# Patient Record
Sex: FEMALE | Race: BLACK OR AFRICAN AMERICAN | Employment: UNEMPLOYED | URBAN - METROPOLITAN AREA
[De-identification: names, ages, dates, MRNs, and addresses within clinical notes are randomized per-mention and may not be internally consistent; named-entity substitution may affect disease eponyms.]

---

## 2022-05-25 LAB
ANTIBODY SCREEN, EXTERNAL: NEGATIVE
CHLAMYDIA, EXTERNAL: NEGATIVE
HBSAG, EXTERNAL: NEGATIVE
HEPATITIS C AB,   EXT: NON REACTIVE
HIV, EXTERNAL: NON REACTIVE
N. GONORRHEA, EXTERNAL: NEGATIVE
RPR, EXTERNAL: NON REACTIVE
TYPE, ABO & RH, EXTERNAL: NORMAL

## 2022-08-17 LAB — GRBS, EXTERNAL: NEGATIVE

## 2022-09-27 ENCOUNTER — HOSPITAL ENCOUNTER (INPATIENT)
Age: 42
LOS: 3 days | Discharge: HOME | End: 2022-09-30
Attending: OBSTETRICS & GYNECOLOGY | Admitting: SPECIALIST
Payer: COMMERCIAL

## 2022-09-27 PROBLEM — Z34.90 PREGNANCY: Status: ACTIVE | Noted: 2022-09-27

## 2022-09-27 LAB
ABO + RH BLD: NORMAL
ALBUMIN SERPL-MCNC: 2.8 G/DL (ref 3.5–5)
ALBUMIN/GLOB SERPL: 0.7 {RATIO} (ref 1.1–2.2)
ALP SERPL-CCNC: 248 U/L (ref 45–117)
ALT SERPL-CCNC: 19 U/L (ref 12–78)
AMPHET UR QL SCN: NEGATIVE
ANION GAP SERPL CALC-SCNC: 9 MMOL/L (ref 5–15)
AST SERPL-CCNC: 18 U/L (ref 15–37)
BARBITURATES UR QL SCN: NEGATIVE
BASOPHILS # BLD: 0 K/UL (ref 0–0.1)
BASOPHILS NFR BLD: 0 % (ref 0–1)
BENZODIAZ UR QL: NEGATIVE
BILIRUB SERPL-MCNC: 0.3 MG/DL (ref 0.2–1)
BLOOD GROUP ANTIBODIES SERPL: NORMAL
BUN SERPL-MCNC: 12 MG/DL (ref 6–20)
BUN/CREAT SERPL: 17 (ref 12–20)
CALCIUM SERPL-MCNC: 9.2 MG/DL (ref 8.5–10.1)
CANNABINOIDS UR QL SCN: NEGATIVE
CHLORIDE SERPL-SCNC: 107 MMOL/L (ref 97–108)
CO2 SERPL-SCNC: 21 MMOL/L (ref 21–32)
COCAINE UR QL SCN: NEGATIVE
CREAT SERPL-MCNC: 0.7 MG/DL (ref 0.55–1.02)
DIFFERENTIAL METHOD BLD: ABNORMAL
DRUG SCRN COMMENT,DRGCM: NORMAL
EOSINOPHIL # BLD: 0 K/UL (ref 0–0.4)
EOSINOPHIL NFR BLD: 1 % (ref 0–7)
ERYTHROCYTE [DISTWIDTH] IN BLOOD BY AUTOMATED COUNT: 13.4 % (ref 11.5–14.5)
GLOBULIN SER CALC-MCNC: 4 G/DL (ref 2–4)
GLUCOSE SERPL-MCNC: 74 MG/DL (ref 65–100)
HCT VFR BLD AUTO: 37.9 % (ref 35–47)
HGB BLD-MCNC: 12.6 G/DL (ref 11.5–16)
IMM GRANULOCYTES # BLD AUTO: 0 K/UL (ref 0–0.04)
IMM GRANULOCYTES NFR BLD AUTO: 1 % (ref 0–0.5)
LYMPHOCYTES # BLD: 1.5 K/UL (ref 0.8–3.5)
LYMPHOCYTES NFR BLD: 22 % (ref 12–49)
MCH RBC QN AUTO: 28.2 PG (ref 26–34)
MCHC RBC AUTO-ENTMCNC: 33.2 G/DL (ref 30–36.5)
MCV RBC AUTO: 84.8 FL (ref 80–99)
METHADONE UR QL: NEGATIVE
MONOCYTES # BLD: 0.6 K/UL (ref 0–1)
MONOCYTES NFR BLD: 9 % (ref 5–13)
NEUTS SEG # BLD: 4.8 K/UL (ref 1.8–8)
NEUTS SEG NFR BLD: 67 % (ref 32–75)
NRBC # BLD: 0 K/UL (ref 0–0.01)
NRBC BLD-RTO: 0 PER 100 WBC
OPIATES UR QL: NEGATIVE
PCP UR QL: NEGATIVE
PLATELET # BLD AUTO: 236 K/UL (ref 150–400)
PMV BLD AUTO: 10.9 FL (ref 8.9–12.9)
POTASSIUM SERPL-SCNC: 4 MMOL/L (ref 3.5–5.1)
PROT SERPL-MCNC: 6.8 G/DL (ref 6.4–8.2)
RBC # BLD AUTO: 4.47 M/UL (ref 3.8–5.2)
SODIUM SERPL-SCNC: 137 MMOL/L (ref 136–145)
SPECIMEN EXP DATE BLD: NORMAL
WBC # BLD AUTO: 7 K/UL (ref 3.6–11)

## 2022-09-27 PROCEDURE — 36415 COLL VENOUS BLD VENIPUNCTURE: CPT

## 2022-09-27 PROCEDURE — 74011250637 HC RX REV CODE- 250/637: Performed by: SPECIALIST

## 2022-09-27 PROCEDURE — 86900 BLOOD TYPING SEROLOGIC ABO: CPT

## 2022-09-27 PROCEDURE — 80053 COMPREHEN METABOLIC PANEL: CPT

## 2022-09-27 PROCEDURE — 85025 COMPLETE CBC W/AUTO DIFF WBC: CPT

## 2022-09-27 PROCEDURE — 80307 DRUG TEST PRSMV CHEM ANLYZR: CPT

## 2022-09-27 PROCEDURE — 65410000002 HC RM PRIVATE OB

## 2022-09-27 PROCEDURE — 3E0DXGC INTRODUCTION OF OTHER THERAPEUTIC SUBSTANCE INTO MOUTH AND PHARYNX, EXTERNAL APPROACH: ICD-10-PCS | Performed by: SPECIALIST

## 2022-09-27 RX ORDER — ONDANSETRON 2 MG/ML
4 INJECTION INTRAMUSCULAR; INTRAVENOUS
Status: DISCONTINUED | OUTPATIENT
Start: 2022-09-27 | End: 2022-09-28

## 2022-09-27 RX ORDER — SODIUM CHLORIDE 0.9 % (FLUSH) 0.9 %
5-40 SYRINGE (ML) INJECTION AS NEEDED
Status: DISCONTINUED | OUTPATIENT
Start: 2022-09-27 | End: 2022-09-28

## 2022-09-27 RX ORDER — OXYTOCIN/RINGER'S LACTATE 30/500 ML
0-20 PLASTIC BAG, INJECTION (ML) INTRAVENOUS
Status: DISCONTINUED | OUTPATIENT
Start: 2022-09-28 | End: 2022-09-28

## 2022-09-27 RX ORDER — SODIUM CHLORIDE 0.9 % (FLUSH) 0.9 %
5-40 SYRINGE (ML) INJECTION EVERY 8 HOURS
Status: DISCONTINUED | OUTPATIENT
Start: 2022-09-27 | End: 2022-09-28

## 2022-09-27 RX ORDER — LABETALOL 100 MG/1
100 TABLET, FILM COATED ORAL 2 TIMES DAILY
COMMUNITY
End: 2022-09-30

## 2022-09-27 RX ORDER — OXYTOCIN/RINGER'S LACTATE 30/500 ML
10 PLASTIC BAG, INJECTION (ML) INTRAVENOUS AS NEEDED
Status: COMPLETED | OUTPATIENT
Start: 2022-09-27 | End: 2022-09-28

## 2022-09-27 RX ORDER — SODIUM CHLORIDE, SODIUM LACTATE, POTASSIUM CHLORIDE, CALCIUM CHLORIDE 600; 310; 30; 20 MG/100ML; MG/100ML; MG/100ML; MG/100ML
125 INJECTION, SOLUTION INTRAVENOUS CONTINUOUS
Status: DISCONTINUED | OUTPATIENT
Start: 2022-09-27 | End: 2022-09-28

## 2022-09-27 RX ORDER — LABETALOL 100 MG/1
100 TABLET, FILM COATED ORAL 2 TIMES DAILY
Status: DISCONTINUED | OUTPATIENT
Start: 2022-09-27 | End: 2022-09-30 | Stop reason: HOSPADM

## 2022-09-27 RX ORDER — BUTORPHANOL TARTRATE 2 MG/ML
2 INJECTION INTRAMUSCULAR; INTRAVENOUS
Status: DISCONTINUED | OUTPATIENT
Start: 2022-09-27 | End: 2022-09-28

## 2022-09-27 RX ORDER — NALOXONE HYDROCHLORIDE 0.4 MG/ML
0.4 INJECTION, SOLUTION INTRAMUSCULAR; INTRAVENOUS; SUBCUTANEOUS AS NEEDED
Status: DISCONTINUED | OUTPATIENT
Start: 2022-09-27 | End: 2022-09-28

## 2022-09-27 RX ORDER — OXYTOCIN/RINGER'S LACTATE 30/500 ML
87.3 PLASTIC BAG, INJECTION (ML) INTRAVENOUS AS NEEDED
Status: DISCONTINUED | OUTPATIENT
Start: 2022-09-27 | End: 2022-09-30 | Stop reason: HOSPADM

## 2022-09-27 RX ADMIN — Medication 25 MCG: at 23:58

## 2022-09-27 RX ADMIN — Medication 25 MCG: at 20:07

## 2022-09-27 RX ADMIN — LABETALOL HYDROCHLORIDE 100 MG: 100 TABLET, FILM COATED ORAL at 20:07

## 2022-09-27 NOTE — PROGRESS NOTES
1910- Bedside and Verbal shift change report given to SANJUANA Cabrera RN (oncoming nurse) by NESTOR Hinkle RN (offgoing nurse). Report included the following information SBAR.     0600- Dr. Hershel Curling assessing FHR and TOCO monitoring. Viewed that Cytotec held at 0400 due to [facility] monitoring and assessment. Ok to start pitocin per Dr. Hershel Curling. 0710- Bedside and Verbal shift change report given to NESTOR Hinkle RN and Atilano Baumgarten RN  (oncoming nurse) by SANJUANA Cabrera RN (offgoing nurse). Report included the following information SBAR.

## 2022-09-27 NOTE — PROGRESS NOTES
1630 - Pt arrived to L&D for scheduled induction. Pt reports positive FM, no LOF or bleeding, pt denies headache, epigastric pain and visual disturbances. Pt states she has not had any problems during this pregnancy, only chronic HTN and takes labetalol 100mg BID. Pt on monitor. 09/27/22 1715   Cervical Exam   Dilation (cm) 1   Eff 50 %   Station -3   Vaginal exam done by?   Dr. Clara Cervantes

## 2022-09-27 NOTE — H&P
History & Physical    Name: Felisha Warren MRN: [id number]  SSN: [id number]    YOB: 1980  Age: 39 y.o. Sex: female      Subjective:     Estimated Date of Delivery: None noted. OB History    Para Term  AB Living   1             SAB IAB Ectopic Molar Multiple Live Births                    # Outcome Date GA Lbr Jason/2nd Weight Sex Delivery Anes PTL Lv   1 Current                MsCollins Bangura is admitted with pregnancy at Unknown for induction of labor due to hypertension. Prenatal course was complicated by chronic hypertension. Please see prenatal records for details. No past medical history on file. No past surgical history on file. Social History     Occupational History    Not on file   Tobacco Use    Smoking status: Not on file    Smokeless tobacco: Not on file   Substance and Sexual Activity    Alcohol use: Not on file    Drug use: Not on file    Sexual activity: Not on file     No family history on file. No Known Allergies  Prior to Admission medications    Not on File        Review of Systems: A comprehensive review of systems was negative except for that written in the History of Present Illness. Objective:     Vitals:  Vitals:    22 1651   Weight: 80.7 kg (178 lb)   Height: 5' 5\" (1.651 m)        Physical Exam:  Ab soft, gravid, nt  Ext nt, trace edema  Membranes:  Intact  Fetal Heart Rate: Reactive   EFW 3500gm  VE: 1/50/-1, vtx, pelvis adequate         Prenatal Labs:   No results found for: ABORH, RUBELLAEXT, HBSAGEXT, HIVEXT, RPREXT, GONNOEXT, CHLAMEXT, ABORHEXT, RUBELLAEXT, GRBSEXT, HBSAGEXT, HIVEXT, RPREXT, GONNOEXT, CHLAMEXT    Impression/Plan:     Active Problems:    Pregnancy (2022)         Plan: Admit for induction of labor.  Group B Strep negative  Cytotec tonight  Pitocin in am.

## 2022-09-28 ENCOUNTER — ANESTHESIA EVENT (OUTPATIENT)
Dept: LABOR AND DELIVERY | Age: 42
End: 2022-09-28
Payer: COMMERCIAL

## 2022-09-28 ENCOUNTER — ANESTHESIA (OUTPATIENT)
Dept: LABOR AND DELIVERY | Age: 42
End: 2022-09-28
Payer: COMMERCIAL

## 2022-09-28 LAB
ERYTHROCYTE [DISTWIDTH] IN BLOOD BY AUTOMATED COUNT: 13.6 % (ref 11.5–14.5)
HCT VFR BLD AUTO: 37.8 % (ref 35–47)
HGB BLD-MCNC: 12.3 G/DL (ref 11.5–16)
MCH RBC QN AUTO: 28 PG (ref 26–34)
MCHC RBC AUTO-ENTMCNC: 32.5 G/DL (ref 30–36.5)
MCV RBC AUTO: 85.9 FL (ref 80–99)
NRBC # BLD: 0 K/UL (ref 0–0.01)
NRBC BLD-RTO: 0 PER 100 WBC
PLATELET # BLD AUTO: 172 K/UL (ref 150–400)
PMV BLD AUTO: 10.2 FL (ref 8.9–12.9)
RBC # BLD AUTO: 4.4 M/UL (ref 3.8–5.2)
WBC # BLD AUTO: 10.1 K/UL (ref 3.6–11)

## 2022-09-28 PROCEDURE — 0KQM0ZZ REPAIR PERINEUM MUSCLE, OPEN APPROACH: ICD-10-PCS | Performed by: OBSTETRICS & GYNECOLOGY

## 2022-09-28 PROCEDURE — 77030008684 HC TU ET CUF COVD -B: Performed by: NURSE ANESTHETIST, CERTIFIED REGISTERED

## 2022-09-28 PROCEDURE — 74011250636 HC RX REV CODE- 250/636: Performed by: SPECIALIST

## 2022-09-28 PROCEDURE — 77030014125 HC TY EPDRL BBMI -B: Performed by: ANESTHESIOLOGY

## 2022-09-28 PROCEDURE — 36415 COLL VENOUS BLD VENIPUNCTURE: CPT

## 2022-09-28 PROCEDURE — 75410000003 HC RECOV DEL/VAG/CSECN EA 0.5 HR

## 2022-09-28 PROCEDURE — 10907ZC DRAINAGE OF AMNIOTIC FLUID, THERAPEUTIC FROM PRODUCTS OF CONCEPTION, VIA NATURAL OR ARTIFICIAL OPENING: ICD-10-PCS | Performed by: OBSTETRICS & GYNECOLOGY

## 2022-09-28 PROCEDURE — 3E033VJ INTRODUCTION OF OTHER HORMONE INTO PERIPHERAL VEIN, PERCUTANEOUS APPROACH: ICD-10-PCS | Performed by: SPECIALIST

## 2022-09-28 PROCEDURE — 74011000250 HC RX REV CODE- 250

## 2022-09-28 PROCEDURE — 00HU33Z INSERTION OF INFUSION DEVICE INTO SPINAL CANAL, PERCUTANEOUS APPROACH: ICD-10-PCS | Performed by: ANESTHESIOLOGY

## 2022-09-28 PROCEDURE — 74011000250 HC RX REV CODE- 250: Performed by: ANESTHESIOLOGY

## 2022-09-28 PROCEDURE — 75410000000 HC DELIVERY VAGINAL/SINGLE

## 2022-09-28 PROCEDURE — 77030026438 HC STYL ET INTUB CARD -A: Performed by: NURSE ANESTHETIST, CERTIFIED REGISTERED

## 2022-09-28 PROCEDURE — 88307 TISSUE EXAM BY PATHOLOGIST: CPT

## 2022-09-28 PROCEDURE — 4A1HXCZ MONITORING OF PRODUCTS OF CONCEPTION, CARDIAC RATE, EXTERNAL APPROACH: ICD-10-PCS | Performed by: OBSTETRICS & GYNECOLOGY

## 2022-09-28 PROCEDURE — 76210000006 HC OR PH I REC 0.5 TO 1 HR

## 2022-09-28 PROCEDURE — 74011250637 HC RX REV CODE- 250/637: Performed by: OBSTETRICS & GYNECOLOGY

## 2022-09-28 PROCEDURE — 65410000002 HC RM PRIVATE OB

## 2022-09-28 PROCEDURE — 76060000078 HC EPIDURAL ANESTHESIA

## 2022-09-28 PROCEDURE — 10D17Z9 MANUAL EXTRACTION OF PRODUCTS OF CONCEPTION, RETAINED, VIA NATURAL OR ARTIFICIAL OPENING: ICD-10-PCS | Performed by: OBSTETRICS & GYNECOLOGY

## 2022-09-28 PROCEDURE — 74011000250 HC RX REV CODE- 250: Performed by: NURSE ANESTHETIST, CERTIFIED REGISTERED

## 2022-09-28 PROCEDURE — 0UQMXZZ REPAIR VULVA, EXTERNAL APPROACH: ICD-10-PCS | Performed by: OBSTETRICS & GYNECOLOGY

## 2022-09-28 PROCEDURE — 75410000002 HC LABOR FEE PER 1 HR

## 2022-09-28 PROCEDURE — 74011250637 HC RX REV CODE- 250/637: Performed by: SPECIALIST

## 2022-09-28 PROCEDURE — 74011250636 HC RX REV CODE- 250/636: Performed by: NURSE ANESTHETIST, CERTIFIED REGISTERED

## 2022-09-28 PROCEDURE — 85027 COMPLETE CBC AUTOMATED: CPT

## 2022-09-28 RX ORDER — SODIUM CHLORIDE 0.9 % (FLUSH) 0.9 %
5-40 SYRINGE (ML) INJECTION EVERY 8 HOURS
Status: DISCONTINUED | OUTPATIENT
Start: 2022-09-28 | End: 2022-09-28

## 2022-09-28 RX ORDER — SODIUM CHLORIDE 9 MG/ML
INJECTION, SOLUTION INTRAVENOUS
Status: DISCONTINUED | OUTPATIENT
Start: 2022-09-28 | End: 2022-09-28 | Stop reason: HOSPADM

## 2022-09-28 RX ORDER — LANOLIN ALCOHOL/MO/W.PET/CERES
1 CREAM (GRAM) TOPICAL
Status: DISCONTINUED | OUTPATIENT
Start: 2022-09-29 | End: 2022-09-30 | Stop reason: HOSPADM

## 2022-09-28 RX ORDER — ONDANSETRON 4 MG/1
4 TABLET, ORALLY DISINTEGRATING ORAL
Status: ACTIVE | OUTPATIENT
Start: 2022-09-28 | End: 2022-09-29

## 2022-09-28 RX ORDER — FENTANYL/BUPIVACAINE/NS/PF 2-1250MCG
12 PREFILLED PUMP RESERVOIR EPIDURAL CONTINUOUS
Status: DISCONTINUED | OUTPATIENT
Start: 2022-09-28 | End: 2022-09-28

## 2022-09-28 RX ORDER — ACETAMINOPHEN 325 MG/1
650 TABLET ORAL
Status: DISCONTINUED | OUTPATIENT
Start: 2022-09-28 | End: 2022-09-30 | Stop reason: HOSPADM

## 2022-09-28 RX ORDER — OXYCODONE AND ACETAMINOPHEN 5; 325 MG/1; MG/1
1 TABLET ORAL
Status: DISCONTINUED | OUTPATIENT
Start: 2022-09-28 | End: 2022-09-30 | Stop reason: HOSPADM

## 2022-09-28 RX ORDER — SUCCINYLCHOLINE CHLORIDE 20 MG/ML
INJECTION INTRAMUSCULAR; INTRAVENOUS AS NEEDED
Status: DISCONTINUED | OUTPATIENT
Start: 2022-09-28 | End: 2022-09-28 | Stop reason: HOSPADM

## 2022-09-28 RX ORDER — DEXAMETHASONE SODIUM PHOSPHATE 4 MG/ML
INJECTION, SOLUTION INTRA-ARTICULAR; INTRALESIONAL; INTRAMUSCULAR; INTRAVENOUS; SOFT TISSUE AS NEEDED
Status: DISCONTINUED | OUTPATIENT
Start: 2022-09-28 | End: 2022-09-28 | Stop reason: HOSPADM

## 2022-09-28 RX ORDER — BUPIVACAINE HYDROCHLORIDE AND EPINEPHRINE 2.5; 5 MG/ML; UG/ML
INJECTION, SOLUTION EPIDURAL; INFILTRATION; INTRACAUDAL; PERINEURAL
Status: COMPLETED
Start: 2022-09-28 | End: 2022-09-28

## 2022-09-28 RX ORDER — LIDOCAINE HYDROCHLORIDE 20 MG/ML
INJECTION, SOLUTION EPIDURAL; INFILTRATION; INTRACAUDAL; PERINEURAL AS NEEDED
Status: DISCONTINUED | OUTPATIENT
Start: 2022-09-28 | End: 2022-09-28 | Stop reason: HOSPADM

## 2022-09-28 RX ORDER — HYDROMORPHONE HYDROCHLORIDE 1 MG/ML
.2-.5 INJECTION, SOLUTION INTRAMUSCULAR; INTRAVENOUS; SUBCUTANEOUS
Status: DISCONTINUED | OUTPATIENT
Start: 2022-09-28 | End: 2022-09-28 | Stop reason: HOSPADM

## 2022-09-28 RX ORDER — BUPIVACAINE HYDROCHLORIDE AND EPINEPHRINE 2.5; 5 MG/ML; UG/ML
INJECTION, SOLUTION EPIDURAL; INFILTRATION; INTRACAUDAL; PERINEURAL
Status: COMPLETED | OUTPATIENT
Start: 2022-09-28 | End: 2022-09-28

## 2022-09-28 RX ORDER — OXYTOCIN/RINGER'S LACTATE 30/500 ML
87.3 PLASTIC BAG, INJECTION (ML) INTRAVENOUS AS NEEDED
Status: DISCONTINUED | OUTPATIENT
Start: 2022-09-28 | End: 2022-09-30 | Stop reason: HOSPADM

## 2022-09-28 RX ORDER — IBUPROFEN 400 MG/1
800 TABLET ORAL EVERY 8 HOURS
Status: DISCONTINUED | OUTPATIENT
Start: 2022-09-28 | End: 2022-09-30 | Stop reason: HOSPADM

## 2022-09-28 RX ORDER — FENTANYL CITRATE 50 UG/ML
INJECTION, SOLUTION INTRAMUSCULAR; INTRAVENOUS AS NEEDED
Status: DISCONTINUED | OUTPATIENT
Start: 2022-09-28 | End: 2022-09-28 | Stop reason: HOSPADM

## 2022-09-28 RX ORDER — ONDANSETRON 2 MG/ML
INJECTION INTRAMUSCULAR; INTRAVENOUS AS NEEDED
Status: DISCONTINUED | OUTPATIENT
Start: 2022-09-28 | End: 2022-09-28 | Stop reason: HOSPADM

## 2022-09-28 RX ORDER — SIMETHICONE 80 MG
80 TABLET,CHEWABLE ORAL
Status: DISCONTINUED | OUTPATIENT
Start: 2022-09-28 | End: 2022-09-30 | Stop reason: HOSPADM

## 2022-09-28 RX ORDER — PROPOFOL 10 MG/ML
INJECTION, EMULSION INTRAVENOUS AS NEEDED
Status: DISCONTINUED | OUTPATIENT
Start: 2022-09-28 | End: 2022-09-28 | Stop reason: HOSPADM

## 2022-09-28 RX ORDER — HYDROCORTISONE ACETATE PRAMOXINE HCL 2.5; 1 G/100G; G/100G
CREAM TOPICAL AS NEEDED
Status: DISCONTINUED | OUTPATIENT
Start: 2022-09-28 | End: 2022-09-30 | Stop reason: HOSPADM

## 2022-09-28 RX ORDER — FENTANYL CITRATE 50 UG/ML
25 INJECTION, SOLUTION INTRAMUSCULAR; INTRAVENOUS
Status: DISCONTINUED | OUTPATIENT
Start: 2022-09-28 | End: 2022-09-28 | Stop reason: HOSPADM

## 2022-09-28 RX ORDER — OXYTOCIN/RINGER'S LACTATE 30/500 ML
10 PLASTIC BAG, INJECTION (ML) INTRAVENOUS AS NEEDED
Status: DISCONTINUED | OUTPATIENT
Start: 2022-09-28 | End: 2022-09-30 | Stop reason: HOSPADM

## 2022-09-28 RX ORDER — FENTANYL/BUPIVACAINE/NS/PF 2-1250MCG
PREFILLED PUMP RESERVOIR EPIDURAL
Status: COMPLETED
Start: 2022-09-28 | End: 2022-09-28

## 2022-09-28 RX ORDER — FENTANYL CITRATE 50 UG/ML
50 INJECTION, SOLUTION INTRAMUSCULAR; INTRAVENOUS AS NEEDED
Status: CANCELLED | OUTPATIENT
Start: 2022-09-28

## 2022-09-28 RX ORDER — EPHEDRINE SULFATE/0.9% NACL/PF 50 MG/5 ML
12.5 SYRINGE (ML) INTRAVENOUS
Status: COMPLETED | OUTPATIENT
Start: 2022-09-28 | End: 2022-09-28

## 2022-09-28 RX ORDER — MORPHINE SULFATE 2 MG/ML
2 INJECTION, SOLUTION INTRAMUSCULAR; INTRAVENOUS
Status: DISCONTINUED | OUTPATIENT
Start: 2022-09-28 | End: 2022-09-30 | Stop reason: HOSPADM

## 2022-09-28 RX ORDER — SODIUM CHLORIDE 0.9 % (FLUSH) 0.9 %
5-40 SYRINGE (ML) INJECTION AS NEEDED
Status: DISCONTINUED | OUTPATIENT
Start: 2022-09-28 | End: 2022-09-28

## 2022-09-28 RX ORDER — LIDOCAINE HYDROCHLORIDE 10 MG/ML
0.1 INJECTION, SOLUTION EPIDURAL; INFILTRATION; INTRACAUDAL; PERINEURAL AS NEEDED
Status: CANCELLED | OUTPATIENT
Start: 2022-09-28

## 2022-09-28 RX ORDER — ONDANSETRON 2 MG/ML
4 INJECTION INTRAMUSCULAR; INTRAVENOUS AS NEEDED
Status: DISCONTINUED | OUTPATIENT
Start: 2022-09-28 | End: 2022-09-28 | Stop reason: HOSPADM

## 2022-09-28 RX ORDER — SODIUM CHLORIDE, SODIUM LACTATE, POTASSIUM CHLORIDE, CALCIUM CHLORIDE 600; 310; 30; 20 MG/100ML; MG/100ML; MG/100ML; MG/100ML
25 INJECTION, SOLUTION INTRAVENOUS CONTINUOUS
Status: DISCONTINUED | OUTPATIENT
Start: 2022-09-28 | End: 2022-09-28 | Stop reason: HOSPADM

## 2022-09-28 RX ORDER — DOCUSATE SODIUM 100 MG/1
100 CAPSULE, LIQUID FILLED ORAL
Status: DISCONTINUED | OUTPATIENT
Start: 2022-09-28 | End: 2022-09-30 | Stop reason: HOSPADM

## 2022-09-28 RX ORDER — FOLIC ACID/MULTIVIT,IRON,MINER 0.4MG-18MG
1 TABLET ORAL DAILY
Status: DISCONTINUED | OUTPATIENT
Start: 2022-09-29 | End: 2022-09-30 | Stop reason: HOSPADM

## 2022-09-28 RX ORDER — ZOLPIDEM TARTRATE 5 MG/1
5 TABLET ORAL
Status: DISCONTINUED | OUTPATIENT
Start: 2022-09-28 | End: 2022-09-30 | Stop reason: HOSPADM

## 2022-09-28 RX ORDER — SODIUM CHLORIDE, SODIUM LACTATE, POTASSIUM CHLORIDE, CALCIUM CHLORIDE 600; 310; 30; 20 MG/100ML; MG/100ML; MG/100ML; MG/100ML
25 INJECTION, SOLUTION INTRAVENOUS CONTINUOUS
Status: CANCELLED | OUTPATIENT
Start: 2022-09-28 | End: 2022-09-29

## 2022-09-28 RX ORDER — MIDAZOLAM HYDROCHLORIDE 1 MG/ML
1 INJECTION, SOLUTION INTRAMUSCULAR; INTRAVENOUS AS NEEDED
Status: CANCELLED | OUTPATIENT
Start: 2022-09-28

## 2022-09-28 RX ORDER — NALOXONE HYDROCHLORIDE 0.4 MG/ML
0.4 INJECTION, SOLUTION INTRAMUSCULAR; INTRAVENOUS; SUBCUTANEOUS AS NEEDED
Status: DISCONTINUED | OUTPATIENT
Start: 2022-09-28 | End: 2022-09-30 | Stop reason: HOSPADM

## 2022-09-28 RX ORDER — DIPHENHYDRAMINE HYDROCHLORIDE 50 MG/ML
12.5 INJECTION, SOLUTION INTRAMUSCULAR; INTRAVENOUS
Status: DISCONTINUED | OUTPATIENT
Start: 2022-09-28 | End: 2022-09-30 | Stop reason: HOSPADM

## 2022-09-28 RX ORDER — PHENYLEPHRINE HCL IN 0.9% NACL 0.4MG/10ML
SYRINGE (ML) INTRAVENOUS AS NEEDED
Status: DISCONTINUED | OUTPATIENT
Start: 2022-09-28 | End: 2022-09-28 | Stop reason: HOSPADM

## 2022-09-28 RX ADMIN — IBUPROFEN 800 MG: 400 TABLET, FILM COATED ORAL at 20:07

## 2022-09-28 RX ADMIN — Medication 12 ML/HR: at 08:50

## 2022-09-28 RX ADMIN — Medication 12.5 MG: at 09:08

## 2022-09-28 RX ADMIN — Medication 1 MILLI-UNITS/MIN: at 06:23

## 2022-09-28 RX ADMIN — LIDOCAINE HYDROCHLORIDE 40 MG: 20 INJECTION, SOLUTION INTRAVENOUS at 17:15

## 2022-09-28 RX ADMIN — FENTANYL CITRATE 25 MCG: 50 INJECTION, SOLUTION INTRAMUSCULAR; INTRAVENOUS at 17:28

## 2022-09-28 RX ADMIN — FENTANYL CITRATE 25 MCG: 50 INJECTION, SOLUTION INTRAMUSCULAR; INTRAVENOUS at 17:31

## 2022-09-28 RX ADMIN — FENTANYL CITRATE 25 MCG: 50 INJECTION, SOLUTION INTRAMUSCULAR; INTRAVENOUS at 17:33

## 2022-09-28 RX ADMIN — SODIUM CHLORIDE: 0.9 INJECTION, SOLUTION INTRAVENOUS at 17:11

## 2022-09-28 RX ADMIN — SUCCINYLCHOLINE CHLORIDE 160 MG: 20 INJECTION, SOLUTION INTRAMUSCULAR; INTRAVENOUS at 17:15

## 2022-09-28 RX ADMIN — SODIUM CHLORIDE, POTASSIUM CHLORIDE, SODIUM LACTATE AND CALCIUM CHLORIDE 125 ML/HR: 600; 310; 30; 20 INJECTION, SOLUTION INTRAVENOUS at 08:33

## 2022-09-28 RX ADMIN — OXYCODONE AND ACETAMINOPHEN 1 TABLET: 5; 325 TABLET ORAL at 14:58

## 2022-09-28 RX ADMIN — BUTORPHANOL TARTRATE 2 MG: 2 INJECTION, SOLUTION INTRAMUSCULAR; INTRAVENOUS at 04:31

## 2022-09-28 RX ADMIN — SODIUM CHLORIDE, POTASSIUM CHLORIDE, SODIUM LACTATE AND CALCIUM CHLORIDE 125 ML/HR: 600; 310; 30; 20 INJECTION, SOLUTION INTRAVENOUS at 06:23

## 2022-09-28 RX ADMIN — OXYCODONE AND ACETAMINOPHEN 1 TABLET: 5; 325 TABLET ORAL at 20:07

## 2022-09-28 RX ADMIN — BUPIVACAINE HYDROCHLORIDE AND EPINEPHRINE 20 ML: 2.5; 5 INJECTION, SOLUTION EPIDURAL; INFILTRATION; INTRACAUDAL; PERINEURAL at 08:32

## 2022-09-28 RX ADMIN — Medication 40 MCG: at 17:41

## 2022-09-28 RX ADMIN — DEXAMETHASONE SODIUM PHOSPHATE 4 MG: 4 INJECTION, SOLUTION INTRAMUSCULAR; INTRAVENOUS at 17:24

## 2022-09-28 RX ADMIN — LABETALOL HYDROCHLORIDE 100 MG: 100 TABLET, FILM COATED ORAL at 20:07

## 2022-09-28 RX ADMIN — FENTANYL CITRATE 50 MCG: 50 INJECTION, SOLUTION INTRAMUSCULAR; INTRAVENOUS at 17:46

## 2022-09-28 RX ADMIN — Medication 10000 MILLI-UNITS: at 12:13

## 2022-09-28 RX ADMIN — ONDANSETRON HYDROCHLORIDE 4 MG: 2 INJECTION, SOLUTION INTRAMUSCULAR; INTRAVENOUS at 17:33

## 2022-09-28 RX ADMIN — Medication 40 MCG: at 17:51

## 2022-09-28 RX ADMIN — PROPOFOL 200 MG: 10 INJECTION, EMULSION INTRAVENOUS at 17:15

## 2022-09-28 RX ADMIN — FENTANYL CITRATE 25 MCG: 50 INJECTION, SOLUTION INTRAMUSCULAR; INTRAVENOUS at 17:21

## 2022-09-28 NOTE — ANESTHESIA POSTPROCEDURE EVALUATION
Procedure(s):  DILATATION AND CURETTAGE. general    Anesthesia Post Evaluation      Multimodal analgesia: multimodal analgesia used between 6 hours prior to anesthesia start to PACU discharge  Patient location during evaluation: PACU  Patient participation: complete - patient participated  Level of consciousness: sleepy but conscious and responsive to verbal stimuli  Pain score: 2  Pain management: adequate  Airway patency: patent  Anesthetic complications: no  Cardiovascular status: acceptable  Respiratory status: acceptable  Hydration status: acceptable  Comments: +Post-Anesthesia Evaluation and Assessment    Patient: Efe Jansen MRN: [id number]  SSN: [id number]   YOB: 1980  Age: 39 y.o. Sex: female      Cardiovascular Function/Vital Signs    BP (!) 149/82   Pulse 75   Temp 37 °C (98.6 °F)   Resp 16   Ht 5' 5\" (1.651 m)   Wt 80.7 kg (178 lb)   SpO2 98%   Breastfeeding Unknown   BMI 29.62 kg/m²     Patient is status post Procedure(s):  DILATATION AND CURETTAGE. Nausea/Vomiting: Controlled. Postoperative hydration reviewed and adequate. Pain:  Pain Scale 1: Numeric (0 - 10) (09/28/22 1840)  Pain Intensity 1: 0 (09/28/22 1840)   Managed. Neurological Status:   Neuro (WDL): Exceptions to WDL (09/28/22 1811)   At baseline. Mental Status and Level of Consciousness: Arousable. Pulmonary Status:   O2 Device: None (Room air) (09/28/22 1840)   Adequate oxygenation and airway patent. Complications related to anesthesia: None    Post-anesthesia assessment completed. No concerns.     Signed By: Tomy Lieberman MD    9/28/2022  Post anesthesia nausea and vomiting:  controlled  Final Post Anesthesia Temperature Assessment:  Normothermia (36.0-37.5 degrees C)      INITIAL Post-op Vital signs:   Vitals Value Taken Time   /82 09/28/22 1840   Temp     Pulse 85 09/28/22 1844   Resp 11 09/28/22 1844   SpO2 99 % 09/28/22 1844   Vitals shown include unvalidated device data.

## 2022-09-28 NOTE — PROGRESS NOTES
Labor Progress Note  Patient seen, fetal heart rate and contraction pattern evaluated, patient examined. No data found. Physical Exam:  Cervical Exam:  4 cm dilated    50% effaced    -2 station    Presenting Part: cephalic  Cervical Position: posterior  Consistency: Medium  Membranes:  Artificial Rupture of Membranes; Amniotic Fluid: small amount of clear fluid  Uterine Activity: Frequency: Every 2-4 minutes  Fetal Heart Rate: Baseline: 150 per minute  Variability: moderate  Accelerations: yes  Decelerations: early occasional early deceleration    Assessment/Plan:  Reassuring fetal status, Labor  Progressing normally, Continue plan for vaginal delivery   Early/ variable decelerations mild; position changes.   May stop pitocin if continues or worsens  BPs controlled  Consents obtained  Epidural when desired

## 2022-09-28 NOTE — ANESTHESIA PROCEDURE NOTES
Epidural Block    Patient location during procedure: OB  Start time: 9/28/2022 8:32 AM  End time: 9/28/2022 8:43 AM  Reason for block: labor epidural  Staffing  Performed: attending   Anesthesiologist: Tamy Dodd MD  Preanesthetic Checklist  Completed: patient identified, IV checked, site marked, risks and benefits discussed, surgical consent, monitors and equipment checked, pre-op evaluation and timeout performed  Block Placement  Patient position: sitting  Prep: DuraPrep  Sterility prep: cap, drape, gloves and mask  Sedation level: no sedation  Patient monitoring: heart rate, frequent blood pressure checks and continuous pulse oximetry  Approach: midline  Location: lumbar  Lumbar location: L2-L3  Epidural  Loss of resistance technique: saline  Guidance: landmark technique  Needle  Needle type: Tuohy   Needle gauge: 17 G  Needle length: 10 cm  Needle insertion depth: 5 cm  Catheter type: multi-orifice  Catheter size: 19 G  Catheter at skin depth: 10 cm  Catheter securement method: clear occlusive dressing and surgical tape  Test dose: negative  Medications Administered  bupivacaine 0.25% -EPINEPHrine 1:200,000 (SENSORCAINE) mg Epidural - Epidural   20 mL - 9/28/2022 8:32:00 AM  Assessment  Sensory level: T10  Block outcome: pain improved  Number of attempts: 1  Procedure assessment: patient tolerated procedure well with no immediate complications

## 2022-09-28 NOTE — ADT AUTH CERT NOTES
[facility]     FACILITY NPI :  FACILITY TAX ID :      [address]  [facility] LABOR & DELIVERY  [address] [facility] [address] [phone]            Patient Name :Latonya Paul   : 1980 (41 yrs)  MRN : [id number]     Patient Mailing Address [address] [place] [address] [] , [phone]                                                               . Insurance Plan Payor: BLUE CROSS / Plan: [address] [place] [address] / Product Type: PPO /      Primary Coverage Subscriber ID :      Secondary Coverage:  N/A     Secondary Subscriber ID :        Current Patient Class : INPATIENT  Admit Date : 2022     REQUESTED LEVEL OF CARE: INPATIENT [101]                                                           Diagnosis : Pregnancy                          ICD10 Code : Pregnancy [I56.66]     Current Room and Bed 3166/01     Admitting and Attending Info:  Admitting Provider : Radha Baum MD   NPI: [id number]  Admitting Provider Phone.  [phone]  Admitting Provider Address: [address], [address]     Attending Provider Alyx Maradiaga MD   [id number]  Attending Provider Address:  [address]win                                                   [address] [place] [address]     Attending Provider Phone: Attending phys phone: [phone] INFORMATION :     Name :  Manuel Velasco    :   2022 (0 dy)      Delivery Type :        Weight in Grams :   3295 g      GA  :   39      Apgar 1 Min :   8  [8]      Apgar 5 Min :   9  [9]      Unit:   [facility] 3  NURSERY    Erik Maker     MRN: [id number]     Link to Baby  Comment        Baby's name MRN Account  Age Sex Admission Type    [address], [address] [id number] 22 0 days F Confirmed Admission - L&D      OB History       1    Para   1    Term   1            AB        Living   1      SAB        IAB        Ectopic        Molar        Multiple   0    Live Births   1         # Outcome Date GA Labor/2nd Weight Sex Delivery Anes PTL Lv A1 A5   1 Term 22 39w4d / 1h 35m 3.295 kg F Vag-Vacuum Epidural N Living 8 9   Name: Anamaria August   Complications: Dysfunctional Labor   Location: Other   Delivering Clinician: Fidencio Au MD        Prenatal History    Flowsheet Row Most Recent Value   Did You Receive Prenatal Care Yes   Name Of OB Provider Dr. Garth Mcgill [facility] (Maternal Fetal Medicine)? Yes   Fetal Ultrasound Abnormalities/Concerns? No   Infant Feeding Breast Milk   Circumcision Planned No   Pediatrician After Birth/ Follow Up Baby Visits Michael Neil     Dating Summary    Working DICK: 10/01/22 set by Ciera Goldstein on 22 based on Other Basis     Based On DICK [address] User Date   Other Basis 10/01/22 Working  Ciera Goldstein 22     Prenatal Results    Prenatal Labs    Test Value Date Time   ABO/Rh * O POS  22    Antibody Scrn * NEGATIVE  22    Hgb      Hct      Platelets      Rubella      RPR * NON REACTIVE  22    T.  Pallidum Antibody      Urine      Hep B Surf Ag * NEGATIVE  22    Hep C * NON REACTIVE  22    HIV * NON REACTIVE  22    Gonorrhea * NEGATIVE  22    Chlamydia * NEGATIVE  22    TSH      GTT, 1 HR (Glucola)      GTT, Fasting      GTT, 1 HR      GTT, 2 HR      GTT, 3 HR        3rd Trimester    Test Value Date Time   Hgb      Hct      Platelets      Group B Strep * NEGATIVE  22    Antibody Scrn      TSH      T. Pallidum Antibody      Hep B Surf Ag      Gonorrhea      Chlamydia       Screening/Diagnostics    Test Value Date Time   AFP Only      AFP Tetra      Hgb Electrophoresis      Amniocentesis      Cystic Fibrosis      [facility]      Canavan      PAP Smear      Beta HCG      NT      NIPT      COVID-19        Lab    Test Value Date Time   GTT, Fasting      GTT, 1HR      GTT, 2HR      GTT, 3HR      RPR * NON REACTIVE  22    Beta HCG      CMV Ab      Toxoplasma Ab      Varicella Zoster Ab         Legend    *: Historical  View all results for this pregnancy     March Rogelio [[id number]]  Graff Delivery    Birth date/time: 2022 12:10:00  Delivery type: Vaginal, Vacuum (Extractor)  Complications: Dysfunctional Labor  Labor Event Times    Dilation complete date/time: 2022 1035  Start pushing date/time: 2022 1038  Labor Events     labor?: No   steroids?: None  Antibiotics during labor?: No  Rupture date/time: 2022 0734  Rupture type: AROM  Fluid color: Clear  Fluid odor: None  Cervical ripening: Misoprostol  Induction: AROM, Prostaglandins  First cervical ripening date/time: 2022  Induction date/time: 2022  Induction indications: Hypertension, Elective  Augmentation: None  Complications: Dysfunctional Labor  Delivery Providers    Delivering clinician: Mike Yang MD  Provider Role   Bindu Penny MD Obstetrician   Marcel Ponce Primary Nurse   oRx Campo Primary Graff Nurse   Lise Antony RN NICU Nurse   Raghavendra Fontaine MD Neonatologist     Anesthesia    Method: Epidural  Multiple Birth Onset Second Stage    Second Stage Start Date: 22 Second Stage Start Time: 103     Operative Delivery    Forceps attempted?: No  Vacuum extractor attempted?: Yes  Vacuum indications: Prolonged Labor, Maternal Fatigue  Vacuum type: Kiwi Omni Cup (mushroom)  First attempt time vacuum applied: 2022 12:09:51  First attempt time vacuum removed: 2022 12:10:34  Number of pop offs: 1  Number of pulls with vacuum: 2  Total vacuum application time: 43 seconds  Vacuum applied by: DR. Wild Loop  Failed vacuum delivery?: No  Presentation    Presentation: Vertex  Position: Occiput Posterior  Apgars    Living status: Living  Apgars:  1 min. :  5 min.:  10 min. :  15 min.:  20 min.:    Skin color:  0  1       Heart rate:  2  2       Reflex irritability:  2  2       Muscle tone:  2  2       Respiratory effort:  2  2       Total:  8  9       Apgars assigned by: Ciarra Gómez RN  Resuscitation    Method: Suctioning-bulb, Tactile Stimulation  Shoulder Dystocia    Shoulder dystocia present?: No  Measurements    Weight: 3295 g  Weight (lbs): 7 lb 4.2 oz  Length: 52.1 cm  Length (in): 20.5\"  Head circumference: 33 cm  Chest circumference: 32 cm  Abdominal girth: 31 cm  Lacerations    Episiotomy: None    Lacerations: 2nd  Repair Needed: Chromic 3-0  # of Repair Packets: 4  Suture Type and Size:   Suture Comment:   Estimated Blood Loss (mL):       Placenta    Placenta Date/Time: 2022 1314  Removal: Spontaneous  Appearance: Intact Placenta disposition: Lab     Vaginal Counts    Initial count personnel: Laureen Howe  Final count personnel: DR. Kriss Gibbs AND NESTOR CLIFFORD RN  Accurate final count?: Yes  Delivery Summary History    Event User Date/Time   Signed Demetrius Freshwater 2022 15:08:28   H&P Notes     H&P by Steffen Ballesteros MD at 22 documented on Admission (Current) from 2022 in [facility] 3 LABOR & DELIVERY    Author: Steffen Ballesteros MD Author Type: Physician Filed: 22   Note Status: Signed Cosign: Cosign Not Required Date of Service: 22   : Steffen Ballesteros MD (Physician)               History & Physical     Name: Yakelin Santana MRN: [id number]  SSN: [id number]    YOB: 1980  Age: 39 y.o. Sex: female       Subjective:      Estimated Date of Delivery: None noted.                     OB History    Para Term  AB Living   1             SAB IAB Ectopic Molar Multiple Live Births                      # Outcome Date GA Lbr Jason/2nd Weight Sex Delivery Anes PTL Lv   1 Current

## 2022-09-28 NOTE — PROGRESS NOTES
12 - Assumed care of patient. 09/28/22 0734   Membranes   Membrane Status AROM   Rupture Date 09/28/22   Rupture Time 0734   Odor None   Amniotic Fluid Description Clear   Amniotic Fluid Volume  Small   Cervical Exam   Dilation (cm) 4   Eff 50 %   Station -2   Vaginal exam done by? Dr. Eleazar Ocampo at bedside at 6131. Patient positioned to side of the bed, EFM & TOCO adjusted to accommodate sterile field. Difficulty tracing due to maternal position. Time out completed at 0006. Successful epidural is completed by Dr Lakshmi Early. Patient is repositioned supine in bed with wedge under left hip. EFM and TOCO replaced and blood pressure frequency increased. Cornejo catheter placed and epidural continuous infusion is started. 1035- Pt complete, Dr. Roxanna Shultz at bedside. RN at bedside continuously monitoring strip. 200- Live BG born via vaginal vacuum assisted by Dr. Roxanna Shultz. Bedside shift change report given to NIKKI Nunez, LISA (oncoming nurse) by NESTOR Daily RN (offgoing nurse). Report included the following information SBAR, Kardex, Intake/Output, and MAR.      1708 - Charge nurse informed me to assume care of patient due to pt bleeding. Pt taken to the OR to fix lacerations by Dr. Roxanna Shultz. 1809 - Pt taken to PACU.    1845 - Pt returned to L&D floor, report given back to NIKKI Ray RN.

## 2022-09-28 NOTE — PROCEDURES
Brief Postoperative Note    Patient: Milton Camarillo  YOB: 1980  MRN: [id number]    Date of Procedure: 9/29/2022     Pre-Op Diagnosis: vaginal laceration repair    Post-Op Diagnosis: Same as preoperative diagnosis. Procedure(s):  DILATATION AND CURETTAGE    Surgeon(s):  Ramya Penny MD    Surgical Assistant: None    Anesthesia: Epidural     Estimated Blood Loss (mL): less than 50     Complications: None    Specimens: * No specimens in log *     Implants: * No implants in log *    Drains: * No LDAs found *    Findings: 2nd degree laceration and bilateral labial lacerations oozing. Reinforcement of repair with 3-0 vicryl performed with improved hemostasis.       Electronically Signed by Sammie Woods MD on 9/28/2022 at 5:56 PM

## 2022-09-28 NOTE — ANESTHESIA PREPROCEDURE EVALUATION
Relevant Problems   No relevant active problems       Anesthetic History   No history of anesthetic complications            Review of Systems / Medical History  Patient summary reviewed, nursing notes reviewed and pertinent labs reviewed    Pulmonary  Within defined limits                 Neuro/Psych   Within defined limits           Cardiovascular    Hypertension              Exercise tolerance: >4 METS     GI/Hepatic/Renal  Within defined limits              Endo/Other  Within defined limits           Other Findings              Physical Exam    Airway  Mallampati: II  TM Distance: 4 - 6 cm  Neck ROM: normal range of motion   Mouth opening: Normal     Cardiovascular  Regular rate and rhythm,  S1 and S2 normal,  no murmur, click, rub, or gallop  Rhythm: regular  Rate: normal         Dental  No notable dental hx       Pulmonary  Breath sounds clear to auscultation               Abdominal  GI exam deferred       Other Findings            Anesthetic Plan    ASA: 2, emergent  Anesthesia type: general            Anesthetic plan and risks discussed with: Patient

## 2022-09-28 NOTE — PERIOP NOTES
1809 Handoff Report from Operating Room to PACU    Report received from [facility] RN and Jerry Latif CRNA regarding Lafayette Babinski. Surgeon(s):  Anesthesia, Prince Lafleur MD  And Procedure(s) (LRB):  SPECIAL PROCEDURE OUTSIDE OF OR (N/A)  confirmed   with allergies and drains discussed. Anesthesia type, drugs, patient history, complications, estimated blood loss, vital signs, intake and output, and last pain medication, lines, and temperature were reviewed. No vaginal bleeding at this time. 1820 No vaginal bleeding at this time. Patient denies pain. 1830 No vaginal bleeding at this time. Patient denies pain. 1840 No vaginal bleeding at this time. Patient denies pain. Tolerating ice chips. 1845 TRANSFER - OUT REPORT:    Verbal report given to A Jose Raul RN(name) on Lafayette Babinski  being transferred to [facility](unit) for routine post - op       Report consisted of patients Situation, Background, Assessment and   Recommendations(SBAR). Information from the following report(s) SBAR, Kardex, OR Summary, Procedure Summary, Intake/Output, and MAR was reviewed with the receiving nurse. Opportunity for questions and clarification was provided. No vaginal bleeding at this time. Patient denies pain.      Patient transported with:   Registered Nurse x 2  Room air

## 2022-09-28 NOTE — PROCEDURES
Delivery Note    Obstetrician:  Sly Sutton MD    Assistant: none    Pre-Delivery Diagnosis: Term pregnancy    Post-Delivery Diagnosis: Living  infant(s) and Female    Intrapartum Event: None    Procedure: Spontaneous vaginal delivery    Epidural: YES    Monitor:  Fetal Heart Tones - External and Uterine Contractions - External    Indications for instrumental delivery: none    Estimated Blood Loss: No data found    Episiotomy: n/a    Laceration(s):  2nd degree, vaginal, and labial    Laceration(s) repair: YES    Presentation: Cephalic    Fetal Description: herrera    Fetal Position: Occiput Anterior    Birth Weight: tbd    Birth Length: tbd    Apgar - One Minute: 8    Apgar - Five Minutes: 9    Umbilical Cord: 3 vessels present  Specimens: placenta  Complications:  none           Cord Blood Results:   Information for the patient's :  Rachael Wright Pending [[id number]]   No results found for: PCTABR, PCTDIG, BILI, ABORH   Prenatal Labs:     Lab Results   Component Value Date/Time    ABO/Rh(D) O POSITIVE 2022 05:02 PM    HBsAg, External NEGATIVE 2022 12:00 AM    HIV, External NON REACTIVE 2022 12:00 AM    RPR, External NON REACTIVE 2022 12:00 AM    Gonorrhea, External NEGATIVE 2022 12:00 AM    Chlamydia, External NEGATIVE 2022 12:00 AM    GrBStrep, External NEGATIVE 2022 12:00 AM        Attending Attestation: I was present and scrubbed for the entire procedure      40 yo  at 39.4 induction for [facility]  well controlled on labetalol. Pt induced with cytotec X 2 and AROM. Pitocin never above 1 unit as fetus did not tolerate pitocin and off most of labor  Pt progressed to complete. Pt pushed and after 1.5 hours of pushing, patient failed to make further progress than +2 station. Pt with poor maternal expulsive efforts due to dense epidural.  Epidural turned off with little improvement in fetal station. Vacuum offered. Risks, benefits of vacuum delivery. Pt given alternatives of continued trail of pushing vs c/s. Pt desired vacuum. Bladder emptied. Fetus delivered in OP position. Vacuum applied in normal fashion. 3 pulls 1 pop off with successful delivery in OP position. Active management of the third stage. Delayed cord clamping for over a minute. Cord doubly clamped and cut. Placenta delivered spontaneously and intact, 3vc. Sent for pathology due to [facility]. Vaginal vault inspected. Multiple lacerations, 2nd degree, bilateral labial, vaginal sulcus tear. Multiple attempts to repair lacerations performed with 3-0 vicryl with shredding of vaginal mucosa with suturing resulting in further bleeding. Surgicel powder placed and pressure held after 2nd degree repair and bilateral labial laceration repairs with some improvement in bleeding. Vaginal vault packed and jackson placed to aid in hemostasis. Will recheck packing and if bleeding does not improve/ saturates packing will take back to OR.

## 2022-09-29 ENCOUNTER — ANESTHESIA (OUTPATIENT)
Dept: LABOR AND DELIVERY | Age: 42
End: 2022-09-29
Payer: COMMERCIAL

## 2022-09-29 PROCEDURE — 65410000002 HC RM PRIVATE OB

## 2022-09-29 PROCEDURE — 74011250637 HC RX REV CODE- 250/637: Performed by: OBSTETRICS & GYNECOLOGY

## 2022-09-29 PROCEDURE — 74011250637 HC RX REV CODE- 250/637: Performed by: SPECIALIST

## 2022-09-29 RX ADMIN — IBUPROFEN 800 MG: 400 TABLET, FILM COATED ORAL at 23:05

## 2022-09-29 RX ADMIN — LABETALOL HYDROCHLORIDE 100 MG: 100 TABLET, FILM COATED ORAL at 18:24

## 2022-09-29 RX ADMIN — ACETAMINOPHEN 650 MG: 325 TABLET ORAL at 12:52

## 2022-09-29 RX ADMIN — IBUPROFEN 800 MG: 400 TABLET, FILM COATED ORAL at 15:23

## 2022-09-29 RX ADMIN — IBUPROFEN 800 MG: 400 TABLET, FILM COATED ORAL at 05:27

## 2022-09-29 RX ADMIN — ACETAMINOPHEN 650 MG: 325 TABLET ORAL at 20:17

## 2022-09-29 RX ADMIN — FERROUS SULFATE TAB 325 MG (65 MG ELEMENTAL FE) 325 MG: 325 (65 FE) TAB at 08:54

## 2022-09-29 RX ADMIN — Medication 1 TABLET: at 08:54

## 2022-09-29 RX ADMIN — LABETALOL HYDROCHLORIDE 100 MG: 100 TABLET, FILM COATED ORAL at 08:54

## 2022-09-29 NOTE — PROGRESS NOTES
Spoke to nursing re patient. Patient has been OOB w/o difficulty. Bleeding/lochia small and appropriate. T 97.8, P 74, /63, R 18. H/H at 1830 hrs 12.3/37. 8.

## 2022-09-29 NOTE — LACTATION NOTE
This note was copied from a baby's chart. 22 8785   Visit Information   Lactation Consult Visit Type IP Initial Consult   Visit Length 45 minutes   Reason for Visit Normal  Visit;Education   Breast- Feeding Assessment   Breast-Feeding Experience No  Equipment: Has a [employer] breast pump; Measured for 24mm flanges   Breast Assessment   Left Breast Medium   Left Nipple Everted   Right Breast Medium   Right Nipple Everted   Mother/Infant Observation   Mother Observation Breast comfortable;Close hold;Recognizes feeding cues   Infant Observation Breast tissue moves; Feeding cues; Frenulum checked; Lips flanged, lower; Lips flanged, upper;Opens mouth  (Oral assessment WDL)   LATCH Documentation   Latch 2   Audible Swallowing 1   Type of Nipple 2   Comfort (Breast/Nipple) 2   Hold (Positioning) 1   LATCH Score 8     Reviewed the \"Your Guide to Breastfeeding\" booklet. Discussed the typical feeding characteristics in the 1st and 2nd DOL and signs of adequate intake. Demonstrated the asymmetric latch and observed baby showing good signs of transfer on the breast. Baby's mouth is small for mother's nipples and may cause nipple pain. Encouraged mother breastfeed for 10-15 minutes each breast. Baby may breastfeed on 3-4 breasts per feeding. Discussed the availability of DM if baby showed continued signs of hunger after breastfeeding and needed a supplement. Discussed a feeding plan and mother's questions were addressed. Plan:  Offer lots of skin to skin and access to the breast.  Feed baby at early signs of hunger every 2-3 hours. Assure a deep latch, check that baby's lips are turned outward and use breast compression to keep baby actively feeding. Pump/hand express for poor feeds and offer baby EBM. Monitor wet and dirty diapers for signs of adequate intake.

## 2022-09-29 NOTE — PROGRESS NOTES
Post-Partum Day Number 1 Progress Note    Patient doing well post-partum without significant complaint. Voiding withour difficulty, normal lochia. Vitals:  Patient Vitals for the past 8 hrs:   BP Temp Pulse Resp SpO2   22 0732 119/65 98.3 °F (36.8 °C) 80 16 96 %   22 0120 100/63 97.8 °F (36.6 °C) 74 18 95 %     Temp (24hrs), Av.5 °F (36.9 °C), Min:97.8 °F (36.6 °C), Max:99 °F (37.2 °C)      Vital signs stable, afebrile. Exam:  Patient without distress. Abdomen soft, fundus firm at level of umbilicus, nontender               Perineum with normal lochia noted. Lower extremities are negative for swelling, cords or tenderness. Lab/Data Review: All lab results for the last 24 hours reviewed. Assessment and Plan:  Patient appears to be having uncomplicated post-partum course. S/p 2nd degree lacteration repair. Bleeding controlled; pt doing well. Continue routine perineal care and maternal education. Plan discharge tomorrow if no problems occur.

## 2022-09-29 NOTE — OP NOTES
[address]  OPERATIVE REPORT    Name:  Fredis Hilario  MR#:  [id number]  :  1980  ACCOUNT #:  [de-identified]  DATE OF SERVICE:  2022    PREOPERATIVE DIAGNOSES:  1. Postpartum hemorrhage. 2.  Persistent bleeding from vaginal laceration. 3.  Status post vacuum-assisted vaginal delivery. POSTOPERATIVE DIAGNOSES:  1. Postpartum hemorrhage. 2.  Status post repair of vaginal laceration. PROCEDURE PERFORMED:  Repair of vaginal laceration. SURGEON:  Torie Kitchen MD    ASSISTANT:  Surgical assist.    ANESTHESIA:  General.    COMPLICATIONS:  None. SPECIMENS REMOVED:  none. IMPLANTS:  None. ESTIMATED BLOOD LOSS:   50 mL. PATHOLOGY:  None. IV FLUIDS:  Approximately 800. URINE OUTPUT:  Please refer to nursing notes. INDICATIONS FOR PROCEDURE:  The patient is a 35-year-old female status post vacuum-assisted vaginal delivery on 2022 at 12:10 p.m. The patient had multiple vaginal lacerations including a second-degree laceration, bilateral labial lacerations, and a vaginal sulcus tear that were repaired at the time of delivery. However, the vaginal lacerations were difficult to achieve hemostasis due to friable vaginal tissue that tore easily with attempts of repair. After the second degree and other lacerations were repaired at the time of delivery, there was still slight bleeding. The patient had Surgicel powder applied and packing placed inside the vagina to help aid in hemostasis. The patient was then rechecked at approximately 5 o'clock and found to have a saturated the packing and bleeding underneath the patient with a half saturated pad. The weighing of the packing and the pad had a blood loss of 160 mL. Secondary to continued bleeding not controlled with packing, the patient was taken urgently to the OR for repair of the vaginal laceration and further exploration of the vaginal vault.     PROCEDURE:  The patient was taken back to the operating room in stable condition. The patient was placed on the operating room table in the supine position. General anesthesia was then administered. The patient was then placed in a dorsal low lithotomy position with bilateral Mohsen stirrups. The patient was then prepped and draped in normal sterile fashion. The patient already had a working Cornejo catheter. Inspection of the vaginal vault showed that the patient had mild bleeding recurrent from each of the vaginal laceration repairs performed earlier. The bleeding was occurring from the second-degree laceration and from bilateral labial lacerations. The vaginal vault was inspected to the level of the cervix and there were no further lacerations that were not previously repaired. A 3-0 Vicryl was selected and each of the areas were reinforced with an interlocking running suture. After each vaginal laceration was reapproximated with 3-0 Vicryl, there appeared to be adequate hemostasis. Pressure was held again at all laceration sites. The vagina was observed for approximately 5 minutes after hemostasis was achieved and there did not appear to be any further areas of bleeding from the lacerations. Surgicel powder was then placed over all operative sites. The patient was then taken out of dorsal lithotomy position, re-placed in supine position. All OR counts were reportedly correct. The patient was taken to the recovery area in stable condition.            MD RUPA Brown/[email or web address]  D:  09/28/2022 18:20  T:  09/28/2022 21:28  JOB #:  [id number]  CC:

## 2022-09-29 NOTE — PROGRESS NOTES
1900 Bedside shift change report given to Rocío Villegas RN (oncoming nurse) by Sherie Fay RN (offgoing nurse). Report included the following information SBAR, Kardex, Intake/Output, and MAR.     2007 went in to give patient pain medication and do assessment. Mom feeding baby will return to do assessment. 2100 shift assessment done     2310 hourly rounding on patient     2420 hourly rounding on patient     0013 patient called out for help with thermostat. Emptied urine hat. Pt denies further nursing assistance at this time     0710 Bedside shift change report given to Rocío Villegas RN (oncoming nurse) by Nakita Prabhakar. Geno Martin RN (offgoing nurse). Report included the following information SBAR, Kardex, Intake/Output, and MAR.

## 2022-09-29 NOTE — LACTATION NOTE
This note was copied from a baby's chart. 22 8683   Visit Information   Lactation Consult Visit Type IP Consult Follow Up   Visit Length 45 minutes   Reason for Visit Normal [place] Visit; Engorgement   Breast- Feeding Assessment   Breast-Feeding Experience No   Breast Assessment   Left Breast Medium   Left Nipple Everted   Right Breast Medium  (Colostrum expressed)   Right Nipple Everted   Mother/Infant Observation   Mother Observation Breast comfortable;Recognizes feeding cues; Close hold   Infant Observation Breast tissue moves; Feeding cues; Lips flanged, lower; Lips flanged, upper   LATCH Documentation   Latch 1   Audible Swallowing 1   Type of Nipple 2   Comfort (Breast/Nipple) 2   Hold (Positioning) 1   LATCH Score 7     Baby has not  since last assessment at 1245. Applied warm compressed to mother's breasts and after several minutes colostrum was expressed. Baby fed several drops of colostrum. Latched several minutes later. Reviewed baby's output and weight loss at 9.6. Discussed a feeding plan to include offering the breast, supplementing with DM and mother pumping. Mother was given the opportunity to ask questions. LC will reassess tomorrow. Plan:  Offer lots of skin to skin and access to the breast.  Feed baby at early signs of hunger every 2-3 hours. Assure a deep latch, check that baby's lips are turned outward and use breast compression to keep baby actively feeding. Pump/hand express for poor feeds and offer baby EBM. Monitor wet and dirty diapers for signs of adequate intake.

## 2022-09-29 NOTE — PROGRESS NOTES
Bedside and Verbal shift change report given to [id number] [place] [address] (oncoming nurse) by NIKKI Nunez RN (offgoing nurse). Report included the following information SBAR, Kardex, and MAR.

## 2022-09-30 VITALS
RESPIRATION RATE: 16 BRPM | HEART RATE: 76 BPM | BODY MASS INDEX: 29.66 KG/M2 | OXYGEN SATURATION: 100 % | WEIGHT: 178 LBS | DIASTOLIC BLOOD PRESSURE: 65 MMHG | TEMPERATURE: 98.7 F | SYSTOLIC BLOOD PRESSURE: 118 MMHG | HEIGHT: 65 IN

## 2022-09-30 PROCEDURE — 74011250637 HC RX REV CODE- 250/637: Performed by: OBSTETRICS & GYNECOLOGY

## 2022-09-30 PROCEDURE — 74011250637 HC RX REV CODE- 250/637: Performed by: SPECIALIST

## 2022-09-30 RX ORDER — LABETALOL 100 MG/1
100 TABLET, FILM COATED ORAL 2 TIMES DAILY
Qty: 60 TABLET | Refills: 1 | Status: SHIPPED | OUTPATIENT
Start: 2022-09-30

## 2022-09-30 RX ORDER — ACETAMINOPHEN 500 MG
1000 TABLET ORAL
Qty: 30 TABLET | Refills: 0 | Status: SHIPPED | OUTPATIENT
Start: 2022-09-30

## 2022-09-30 RX ORDER — LANOLIN ALCOHOL/MO/W.PET/CERES
325 CREAM (GRAM) TOPICAL
Qty: 30 TABLET | Refills: 2 | Status: SHIPPED | OUTPATIENT
Start: 2022-09-30

## 2022-09-30 RX ORDER — FOLIC ACID/MULTIVIT,IRON,MINER 0.4MG-18MG
1 TABLET ORAL DAILY
Qty: 30 TABLET | Refills: 2 | Status: SHIPPED | OUTPATIENT
Start: 2022-09-30

## 2022-09-30 RX ORDER — DOCUSATE SODIUM 100 MG/1
100 CAPSULE, LIQUID FILLED ORAL
Qty: 60 CAPSULE | Refills: 0 | Status: SHIPPED | OUTPATIENT
Start: 2022-09-30 | End: 2022-12-29

## 2022-09-30 RX ORDER — IBUPROFEN 800 MG/1
800 TABLET ORAL EVERY 8 HOURS
Qty: 30 TABLET | Refills: 0 | Status: SHIPPED | OUTPATIENT
Start: 2022-09-30

## 2022-09-30 RX ADMIN — LABETALOL HYDROCHLORIDE 100 MG: 100 TABLET, FILM COATED ORAL at 08:24

## 2022-09-30 RX ADMIN — ACETAMINOPHEN 650 MG: 325 TABLET ORAL at 06:10

## 2022-09-30 RX ADMIN — ACETAMINOPHEN 650 MG: 325 TABLET ORAL at 01:07

## 2022-09-30 RX ADMIN — IBUPROFEN 800 MG: 400 TABLET, FILM COATED ORAL at 08:24

## 2022-09-30 RX ADMIN — Medication 1 TABLET: at 08:24

## 2022-09-30 RX ADMIN — FERROUS SULFATE TAB 325 MG (65 MG ELEMENTAL FE) 325 MG: 325 (65 FE) TAB at 08:24

## 2022-09-30 NOTE — DISCHARGE SUMMARY
svObstetrical Discharge Summary     Name: Josefina Foster MRN: [id number]  SSN: [id number]    YOB: 1980  Age: 39 y.o. Sex: female      Allergies: Patient has no known allergies. Admit Date: 2022    Discharge Date: 2022     Admitting Physician: Cat Abreu MD     Attending Physician:  Sharon Almonte MD     * Admission Diagnoses: Pregnancy [Z34.90]    * Discharge Diagnoses:   Information for the patient's :  Mayte Elizabeth [[id number]]   Delivery of a 3.295 kg female infant via Vaginal, Vacuum (Extractor) on 2022 at 12:10 PM  by Verlinda Sauce. Apgars were 8  and 9 . Additional Diagnoses:   Hospital Problems as of 2022 Never Reviewed            Codes Class Noted - Resolved POA    Pregnancy ICD-10-CM: Z34.90  ICD-9-CM: V22.2  2022 - Present Unknown          Lab Results   Component Value Date/Time    ABO/Rh(D) O POSITIVE 2022 05:02 PM    GrBStrep, External NEGATIVE 2022 12:00 AM    ABO,Rh O POS 2022 12:00 AM    There is no immunization history for the selected administration types on file for this patient. * Procedures: spontaneous vaginal delivery  Procedure(s):  SPECIAL PROCEDURE OUTSIDE OF OR           * Discharge Condition: [facility] Course: Normal hospital course following the delivery. * Disposition: Home    Discharge Medications:   Current Discharge Medication List        START taking these medications    Details   acetaminophen (TYLENOL) 500 mg tablet Take 2 Tablets by mouth every six (6) hours as needed for Pain. Qty: 30 Tablet, Refills: 0  Start date: 2022      docusate sodium (COLACE) 100 mg capsule Take 1 Capsule by mouth two (2) times daily as needed for Constipation for up to 90 days. Qty: 60 Capsule, Refills: 0  Start date: 2022, End date: 2022      ferrous sulfate 325 mg (65 mg iron) tablet Take 1 Tablet by mouth daily (with breakfast).   Qty: 30 Tablet, Refills: 2  Start date: 2022 ibuprofen (MOTRIN) 800 mg tablet Take 1 Tablet by mouth every eight (8) hours. Qty: 30 Tablet, Refills: 0  Start date: 9/30/2022      prenatal vitamin 28 mg iron- 800 mcg tab tablet Take 1 Tablet by mouth daily. Qty: 30 Tablet, Refills: 2  Start date: 9/30/2022           CONTINUE these medications which have CHANGED    Details   labetaloL (NORMODYNE) 100 mg tablet Take 1 Tablet by mouth two (2) times a day. Qty: 60 Tablet, Refills: 1  Start date: 9/30/2022             * Follow-up Care/Patient Instructions:   Activity: Activity as tolerated; no heavy lifting, no strenuous activity, no sex, no driving for 2 weeks  Diet: Regular Diet  Wound Care: Keep wound clean and dry    Follow-up Information       Follow up With Specialties Details Why Contact Sabi Quintero MD Obstetrics & Gynecology Schedule an appointment as soon as possible for a visit in 6 week(s) postpartum check up [address]  [phone]      None    None (395) Patient stated that they have no PCP      Anjali Plummer MD Obstetrics & Gynecology Schedule an appointment as soon as possible for a visit in 1 week(s) BP follow up [address]  [phone]

## 2022-09-30 NOTE — PROGRESS NOTES
Post-Partum Day Number 2 Progress Note    Angelina Rosebud     Information for the patient's :  Narendra Wiggins [[id number]]   Vaginal, Vacuum (Extractor)  Patient doing well without significant complaint. Voiding without difficulty, normal lochia. Breastfeeding w/ difficulty    Vitals:  Visit Vitals  BP (!) 111/56 (BP 1 Location: Right upper arm, BP Patient Position: At rest)   Pulse 65   Temp 97.9 °F (36.6 °C)   Resp 17   Ht 5' 5\" (1.651 m)   Wt 80.7 kg (178 lb)   SpO2 100%   Breastfeeding Unknown   BMI 29.62 kg/m²     Temp (24hrs), Av °F (36.7 °C), Min:97.9 °F (36.6 °C), Max:98.1 °F (36.7 °C)          Exam:         Patient without distress. Abdomen soft, fundus firm, nontender                Lower extremities are negative for swelling, cords or tenderness. Breastfeeding. Labs:     Lab Results   Component Value Date/Time    WBC 10.1 2022 06:22 PM    WBC 7.0 2022 05:02 PM    HGB 12.3 2022 06:22 PM    HGB 12.6 2022 05:02 PM    HCT 37.8 2022 06:22 PM    HCT 37.9 2022 05:02 PM    PLATELET 162  06:22 PM    PLATELET 253  05:02 PM       No results found for this or any previous visit (from the past 24 hour(s)). Assessment: Doing well, post partum day 2      Plan:   1. Discharge home today  2. Follow up in office in 6 weeks with Dr Tony Green  3. Post partum activity advised, diet as tolerated  4.  Discharge Medications: ibuprofen, tylenol and medications prior to admission

## 2022-09-30 NOTE — PROGRESS NOTES
Post-Partum Day Number 2 Progress/Discharge Note    Patient doing well post-partum without significant complaint. Voiding without difficulty, normal lochia, positive flatus. Vitals:  Patient Vitals for the past 8 hrs:   BP Temp Pulse Resp SpO2   22 0024 (!) 111/56 97.9 °F (36.6 °C) 65 17 100 %     Temp (24hrs), Av.1 °F (36.7 °C), Min:97.9 °F (36.6 °C), Max:98.3 °F (36.8 °C)      Vital signs stable, afebrile. Exam:  Patient without distress. Abdomen soft, fundus firm at level of umbilicus, non tender               Perineum with normal lochia noted. Lower extremities are negative for swelling, cords or tenderness. Lab/Data Review:  BMP: No results found for: NA, K, CL, CO2, AGAP, GLU, BUN, CREA, GFRAA, GFRNA  CMP: No results found for: NA, K, CL, CO2, AGAP, GLU, BUN, CREA, GFRAA, GFRNA, CA, MG, PHOS, ALB, TBIL, TP, ALB, GLOB, AGRAT, ALT  CBC: No results found for: WBC, HGB, HGBEXT, HCT, HCTEXT, PLT, PLTEXT, HGBEXT, HCTEXT, PLTEXT    Assessment and Plan:  Patient appears to be having uncomplicated post-partum course. Continue routine perineal care and maternal education. Plan discharge for today with follow up in our office in 4-6 weeks.

## 2022-09-30 NOTE — LACTATION NOTE
This note was copied from a baby's chart. 22 1000   Visit Information   Lactation Consult Visit Type IP Consult Follow Up   Visit Length 60 minutes   Referral Received From Lactation Consultant Follow-up   Reason for Visit Normal [place] Visit;Education   Breast- Feeding Assessment   Breast-Feeding Experience No  Equipment:Has a [employer] breast pump; Measured for 24mm flanges on the right and 27mm on the left)   Breast Assessment   Left Breast Medium  (Colostrum expressed)   Left Nipple Everted   Right Breast Medium  (Colostrum expressed)   Right Nipple Everted   Mother/Infant Observation   Mother Observation Breast comfortable;Close hold;Recognizes feeding cues   Infant Observation Breast tissue moves; Feeding cues; Frenulum checked; Latches nipple and aereolae;Lips flanged, lower; Lips flanged, upper;Opens mouth  (Oral assessment WDL)   LATCH Documentation   Latch 2   Audible Swallowing 2   Type of Nipple 2   Comfort (Breast/Nipple) 2   Hold (Positioning) 1   LATCH Score 9     Mother's breasts are filling today and baby was observed showing good signs of transfer on the breast. Observed mother pumping. Discussed a feeding plan to include breastfeeding every 2 1/2 hours or earlier if baby shows signs of hunger. Mother pump and supplement baby with EBM. Monitor wet and dirty diapers for signs of adequate intake. Advised mother to offer baby formula for signs of inadequate intake and maintain pumping to stimulate her supply. Mother questions were addressed.      Goals: 1) Feed baby, 2) Reach full milk supply, 3) Baby transfer well on the breast    Offer lots of skin to skin and access to the breast  Feed baby at early signs of hunger every 2-3 hours  Assure a deep latch, check that baby's lips are turned outward and use breast compression to keep baby actively feeding  Offer baby a supplement of expressed breast milk via pace bottle feeding  Day of life 3 a typical feeding is 15-30mls/feeding  Day of life 4 a typical feeding is 30-45mls/feeding  Pump breasts for 15 minutes using proper flange size  Monitor wet and dirty diapers for signs of adequate intake  Discuss feeding plan at Peds visit and make adjustment as advised   Manage engorgement as discussed

## 2022-09-30 NOTE — DISCHARGE SUMMARY
Obstetrical Discharge Summary     Name: Latonya Paul MRN: [id number]  SSN: [id number]    YOB: 1980  Age: 39 y.o. Sex: female      Allergies: Patient has no known allergies. Admit Date: 2022    Discharge Date: 2022     Admitting Physician: Radha Baum MD     Attending Physician:  Alyx Maradiaga MD     * Admission Diagnoses: Pregnancy [Z34.90]    * Discharge Diagnoses:   Information for the patient's :  Hayley Wynn [[id number]]   Delivery of a 3.295 kg female infant via Vaginal, Vacuum (Extractor) on 2022 at 12:10 PM  by Tho Lal. Apgars were 8  and 9 . Additional Diagnoses:   Hospital Problems as of 2022 Never Reviewed            Codes Class Noted - Resolved POA    Pregnancy ICD-10-CM: Z34.90  ICD-9-CM: V22.2  2022 - Present Unknown          Lab Results   Component Value Date/Time    ABO/Rh(D) O POSITIVE 2022 05:02 PM    GrBStrep, External NEGATIVE 2022 12:00 AM    ABO,Rh O POS 2022 12:00 AM    There is no immunization history for the selected administration types on file for this patient. * Procedures: , vaginal lac repair  Procedure(s):  SPECIAL PROCEDURE OUTSIDE OF OR           * Discharge Condition: good and stable    * Hospital Course: Postpartum course was complicated by postpartum hemorrhage, which added 0 days to the patient's length of stay. * Disposition: Home    Discharge Medications:   Current Discharge Medication List        START taking these medications    Details   acetaminophen (TYLENOL) 500 mg tablet Take 2 Tablets by mouth every six (6) hours as needed for Pain. Qty: 30 Tablet, Refills: 0  Start date: 2022      docusate sodium (COLACE) 100 mg capsule Take 1 Capsule by mouth two (2) times daily as needed for Constipation for up to 90 days.   Qty: 60 Capsule, Refills: 0  Start date: 2022, End date: 2022      ferrous sulfate 325 mg (65 mg iron) tablet Take 1 Tablet by mouth daily (with breakfast). Qty: 30 Tablet, Refills: 2  Start date: 9/30/2022      ibuprofen (MOTRIN) 800 mg tablet Take 1 Tablet by mouth every eight (8) hours. Qty: 30 Tablet, Refills: 0  Start date: 9/30/2022      prenatal vitamin 28 mg iron- 800 mcg tab tablet Take 1 Tablet by mouth daily. Qty: 30 Tablet, Refills: 2  Start date: 9/30/2022           CONTINUE these medications which have CHANGED    Details   labetaloL (NORMODYNE) 100 mg tablet Take 1 Tablet by mouth two (2) times a day. Qty: 60 Tablet, Refills: 1  Start date: 9/30/2022             * Follow-up Care/Patient Instructions:   Activity: Activity as tolerated, No sex for 6 weeks, and No heavy lifting for 6 weeks  Diet: Regular Diet  Wound Care: Keep wound clean and dry    Follow-up Information       Follow up With Specialties Details Why Contact Monica Quintero MD Obstetrics & Gynecology Schedule an appointment as soon as possible for a visit in 6 week(s) postpartum check up [address] [address] [phone]

## 2022-09-30 NOTE — LACTATION NOTE
This note was copied from a baby's chart. 22 1000   Visit Information   Lactation Consult Visit Type IP Consult Follow Up   Visit Length 60 minutes   Referral Received From Lactation Consultant Follow-up   Reason for Visit Normal  Visit;Education   Breast- Feeding Assessment   Breast-Feeding Experience No  (Has a [employer] breast pump; Measured for 24mm flanges on the right and 27mm on the left)   Breast Assessment   Left Breast Medium  (Colostrum expressed)   Left Nipple Everted   Right Breast Medium  (Colostrum expressed)   Right Nipple Everted   Mother/Infant Observation   Mother Observation Breast comfortable;Close hold;Recognizes feeding cues   Infant Observation Breast tissue moves; Feeding cues; Frenulum checked; Latches nipple and aereolae;Lips flanged, lower; Lips flanged, upper;Opens mouth  (Oral assessment WDL)   LATCH Documentation   Latch 2   Audible Swallowing 2   Type of Nipple 2   Comfort (Breast/Nipple) 2   Hold (Positioning) 1   LATCH Score 9

## 2022-09-30 NOTE — ROUTINE PROCESS
Bedside and Verbal shift change report given to JOHANN Mcgraw RN (oncoming nurse) by Corey Ruiz RN (offgoing nurse). Report included the following information SBAR, Procedure Summary, Intake/Output, and MAR.

## 2022-09-30 NOTE — DISCHARGE INSTRUCTIONS
POSTPARTUM DISCHARGE INSTRUCTIONS       Name:  Ruby Bland  YOB: 1980  Admission Diagnosis:  Pregnancy [Z34.90]     Discharge Diagnosis:  [unfilled]  Attending Physician:  [unfilled]    Delivery Type:  Vaginal Childbirth with Episiotomy, Laceration or Tear: What To Expect At [address] [place] [address] body will slowly heal in the next few weeks. It is easy to get too tired and overwhelmed during the first weeks after your baby is born. Changes in your hormones can shift your mood without warning. You may find it hard to meet the extra demands on your energy and time. Take it easy on yourself. Follow-up care is a key part of your treatment and safety. Be sure to make and go to all appointments, and call your doctor if you are having problems. It's also a good idea to know your test results and keep a list of the medicines you take. How can you care for yourself at home? Vaginal Bleeding and Cramps  After delivery, you will have a bloody discharge from the vagina. This will turn pink within a week and then white or yellow after about 10 days. It may last for 2 to 4 weeks or longer, until the uterus has healed. Use pads instead of tampons until you stop bleeding. Do not worry if you pass some blood clots, as long as they are smaller than a golf ball. If you have a tear or stitches in your vaginal area, change the pad at least every 4 hours to prevent soreness and infection. You may have cramps for the first few days after childbirth. These are normal and occur as the uterus shrinks to normal size. Take an over-the-counter pain medicine, such as acetaminophen (Tylenol), ibuprofen (Advil, Motrin), or naproxen (Aleve), for cramps. Read and follow all instructions on the label. Do not take aspirin, because it can cause more bleeding. Do not take acetaminophen (Tylenol) and other acetaminophen containing medications (i.e. Percocet) at the same time.      Episiotomy, Lacerations or Tears  If you have stitches, they will dissolve on their own and do not need to be removed. Put ice or a cold pack on your painful area for 10 to 20 minutes at a time, several times a day, for the first few days. Put a thin cloth between the ice and your skin. Sit in a few inches of warm water (sitz bath) 3 times a day and after bowel movements. The warm water helps with pain and itching. If you do not have a tub, a warm shower might help. Breast fullness  Your breasts may overfill (engorge) in the first few days after delivery. To help milk flow and to relieve pain, warm your breasts in the shower or by using warm, moist towels before nursing. If you are not nursing, do not put warmth on your breasts or touch your breasts. Wear a tight bra or sports bra and use ice until the fullness goes away. This usually takes 2 to 3 days. Put ice or a cold pack on your breast after nursing to reduce swelling and pain. Put a thin cloth between the ice and your skin. Activity  Eat a balanced diet. Do not try to lose weight by cutting calories. Keep taking your prenatal vitamins, or take a multivitamin. Get as much rest as you can. Try to take naps when your baby sleeps during the day. Get some exercise every day. But do not do any heavy exercise until your doctor says it is okay. Wait until you are healed (about 4 to 6 weeks) before you have sexual intercourse. Your doctor will tell you when it is okay to have sex. Talk to your doctor about birth control. You can get pregnant even before your period returns. Also, you can get pregnant while you are breast-feeding. Mental Health  Many women get the \"baby blues\" during the first few days after childbirth. You may lose sleep, feel irritable, and cry easily. You may feel happy one minute and sad the next. Hormone changes are one cause of these emotional changes. Also, the demands of a new baby, along with visits from relatives or other family needs, add to a mother's stress. The \"baby blues\" often peak around the fourth day. Then they ease up in less than 2 weeks. If your moodiness or anxiety lasts for more than 2 weeks, or if you feel like life is not worth living, you may have postpartum depression. This is different for each mother. Some mothers with serious depression may worry intensely about their infant's well-being. Others may feel distant from their child. Some mothers might even feel that they might harm their baby. A mother may have signs of paranoia, wondering if someone is watching her. With all the changes in your life, you may not know if you are depressed. Pregnancy sometimes causes changes in how you feel that are similar to the symptoms of depression. Symptoms of depression include:  Feeling sad or hopeless and losing interest in daily activities. These are the most common symptoms of depression. Sleeping too much or not enough. Feeling tired. You may feel as if you have no energy. Eating too much or too little. POSTPARTUM SUPPORT [employer]ATIONAL ([employer]I) offers a Warm line; Chat with the Expert phone sessions; Information and Articles about Pregnancy and Postpartum Mood Disorders; Comprehensive List of Free Support Groups; Knowledgeable local coordinators who will offer support, information, and resources; Guide to Resources on [employer]; Calendar of events in the  mood disorders community; Latest News and Research; and [facility] [address] for [employer]. Remember - You are not alone; You are not to blame; With help, you will be well. 0-335-064-PPD(9297). WWW. POSTPARTUM. NET    Writing or talking about death, such as writing suicide notes or talking about guns, knives, or pills. Keep the numbers for these national suicide hotlines: 1-327-265-TALK (7-471.237.5879) and 4-266-SEVDHRF (0-710.125.4803). If you or someone you know talks about suicide or feeling hopeless, get help right away.     Constipation and Hemorrhoids  Drink plenty of fluids, enough so that your urine is light yellow or clear like water. If you have kidney, heart, or liver disease and have to limit fluids, talk with your doctor before you increase the amount of fluids you drink. Eat plenty of fiber each day. Have a bran muffin or bran cereal for breakfast, and try eating a piece of fruit for a mid-afternoon snack. For painful, itchy hemorrhoids, put ice or a cold pack on the area several times a day for 10 minutes at a time. Follow this by putting a warm compress on the area for another 10 to 20 minutes or by sitting in a shallow, warm bath. When should you call for help? Call 911 anytime you think you may need emergency care. For example, call if:  You are thinking of hurting yourself, your baby, or anyone else. You passed out (lost consciousness). You have symptoms of a blood clot in your lung (called a pulmonary embolism). These may include:  Sudden chest pain. Trouble breathing. Coughing up blood. Call your doctor now or seek immediate medical care if:  You have severe vaginal bleeding. You are soaking through a pad each hour for 2 or more hours. Your vaginal bleeding seems to be getting heavier or is still bright red 4 days after delivery. You are dizzy or lightheaded, or you feel like you may faint. You are vomiting or cannot keep fluids down. You have a fever. You have new or more belly pain. You pass tissue (not just blood). Your vaginal discharge smells bad. Your belly feels tender or full and hard. Your breasts are continuously painful or red. You feel sad, anxious, or hopeless for more than a few days. You have sudden, severe pain in your belly. You have symptoms of a blood clot in your leg (called a deep vein thrombosis), such as:  Pain in your calf, back of the knee, thigh, or groin. Redness and swelling in your leg or groin. You have symptoms of preeclampsia, such as:  Sudden swelling of your face, hands, or feet.   New vision problems (such as dimness or blurring). A severe headache. Your blood pressure is higher than it should be or rises suddenly. You have new nausea or vomiting. Watch closely for changes in your health, and be sure to contact your doctor if you have any problems. Additional Information:  Preventing Infection at Home    We care about preventing infection and avoiding the spread of germs - not only when you are in the hospital but also when you return home. When you return home from the hospital, it's important to take the following steps to help prevent infection and avoid spreading germs that could infect you and others. Ask everyone in your home to follow these guidelines, too. Clean Your Hands  Clean your hands whenever your hands are visibly dirty, before you eat, before or after touching your mouth, nose or eyes, and before preparing food. Clean them after contact with body fluids, using the restroom, touching animals or changing diapers. When washing hands, wet them with warm water and work up a lather. Rub hands for at least 15 seconds, then rinse them and pat them dry with a clean towel or paper towel. When using hand sanitizers, it should take about 15 seconds to rub your hands dry. If not, you probably didn't apply enough . Cover Your Sneeze or Cough  Germs are released into the air whenever you sneeze or cough. To prevent the spread of infection:  Turn away from other people before coughing or sneezing. Cover your mouth or nose with a tissue when you cough or sneeze. Put the tissue in the trash. If you don't have a tissue, cough or sneeze into your upper sleeve, not your hands. Always clean your hands after coughing or sneezing. Care for Wounds  Your skin is your body's first line of defense against germs, but an open wound leaves an easy way for germs to enter your body.  To prevent infection:  Clean your hands before and after changing wound dressings, and wear gloves to change dressings if recommended by your doctor. Take special care with IV lines or other devices inserted into the body. If you must touch them, clean your hands first.  Follow any specific instructions from your doctor to care for your wounds. Contact your doctor if you experience any signs of infection, such as fever or increased redness at the surgical or wound site. Keep a Clean Home  Clean or wipe commonly touched hard surfaces like door handles, sinks, tabletops, phones and TV remotes. Use products labeled \"disinfectant\" to kill harmful bacteria and viruses. Use a clean cloth or paper towel to clean and dry surfaces. Wiping surfaces with a dirty dishcloth, sponge or towel will only spread germs. Never share toothbrushes, gaspar, drinking glasses, utensils, razor blades, face cloths or bath towels to avoid spreading germs. Be sure that the linens that you sleep on are clean. Keep pets away from wounds and wash your hands after touching pets, their toys or bedding. We care about you and your health. Remember, preventing infections is a   team effort between you, your family, friends and health care providers. Postpartum Support    PARENTS:  Are you feeling sad or depressed? Is it difficult for you to enjoy yourself? Do you feel more irritable or tense? Do you feel anxious or panicky? Are you having difficulty bonding with your baby? Do you feel as if you are \"out of control\" or \"going crazy\"? Are you worried that you might hurt your baby or yourself? FAMILIES: Do you worry that something is wrong but don't know how to help? Do you think that your partner or spouse is having problems coping? Are you worried that it may never get better? While many women experience some mild mood change or \"the blues\" during or after the birth of a child, 1 in 9 women experience more significant symptoms of depression or anxiety. 1 in 10 Dads become depressed during the first year.     Things you can do  Being a good parent includes taking care of yourself. If you take care of yourself, you will be able to take better care of your baby and your family. Talk to a counselor or healthcare provider who has training in  mood and anxiety problems. Learn as much as you can about pregnancy and postpartum depression and anxiety. Get support from family and friends. Ask for help when you need it. Join a support group in your area or online. Keep active by walking, stretching or whatever form of exercise helps you to feel better. Get enough rest and time for yourself. Eat a healthy diet. Don't give up! It may take more than one try to get the right help you need. These are general instructions for a healthy lifestyle:    No smoking/ No tobacco products/ Avoid exposure to second hand smoke    Surgeon General's Warning:  Quitting smoking now greatly reduces serious risk to your health. Obesity, smoking, and sedentary lifestyle greatly increases your risk for illness    A healthy diet, regular physical exercise & weight monitoring are important for maintaining a healthy lifestyle    Recognize signs and symptoms of STROKE:    F-face looks uneven    A-arms unable to move or move unevenly    S-speech slurred or non-existent    T-time-call 911 as soon as signs and symptoms begin - DO NOT go       back to bed or wait to see if you get better - TIME IS BRAIN. I have had the opportunity to make my options or choices for discharge. I have received and understand these instructions.

## 2022-09-30 NOTE — ROUTINE PROCESS
Bedside and Verbal shift change report given to JOHANN Mcgraw RN (oncoming nurse) by Cass Pruitt RN (offgoing nurse). Report included the following information SBAR, Kardex, and Med Rec Status.

## 2022-10-04 ENCOUNTER — TELEPHONE (OUTPATIENT)
Dept: MOTHER INFANT UNIT | Age: 42
End: 2022-10-04

## 2022-10-04 NOTE — TELEPHONE ENCOUNTER
Mother contacted for follow-up breastfeeding check. Mother states that the she has been bottle feeding baby formula since her 1st Peds visit. Doing some pumping and storing EBM. States her milk come in today. She is using her Motif Fanny breast pump. 24mm flange on the right and 28mm on the left. Single pumping each breast for 30 minutes. Reviewed the supply and demand concept of breast milk production and the importance of regular and proper emptying. Recommended mother pump every 2-3 hours for 10 minutes, rest and pump for another 10 minutes. This will elicit another letdown and she collect more breast milk. Mother was given the opportunity to ask questions and requested a call back in 3 days.     Dandre COXN, RNC, [facility], [employer]

## 2022-10-27 ENCOUNTER — TRANSCRIBE ORDER (OUTPATIENT)
Dept: SCHEDULING | Age: 42
End: 2022-10-27

## 2022-10-27 DIAGNOSIS — Z12.31 ENCOUNTER FOR SCREENING MAMMOGRAM FOR MALIGNANT NEOPLASM OF BREAST: Primary | ICD-10-CM

## 2023-04-22 DIAGNOSIS — Z12.31 ENCOUNTER FOR SCREENING MAMMOGRAM FOR MALIGNANT NEOPLASM OF BREAST: Primary | ICD-10-CM

## 2023-08-22 ENCOUNTER — HOSPITAL ENCOUNTER (OUTPATIENT)
Facility: HOSPITAL | Age: 43
Discharge: HOME | End: 2023-08-25
Payer: COMMERCIAL

## 2023-08-22 DIAGNOSIS — Z12.31 ENCOUNTER FOR SCREENING MAMMOGRAM FOR MALIGNANT NEOPLASM OF BREAST: ICD-10-CM

## 2023-08-22 PROCEDURE — 77067 SCR MAMMO BI INCL CAD: CPT

## 2024-08-26 ENCOUNTER — HOSPITAL ENCOUNTER (OUTPATIENT)
Facility: HOSPITAL | Age: 44
Discharge: HOME | End: 2024-08-29
Payer: COMMERCIAL

## 2024-08-26 VITALS — BODY MASS INDEX: 29.66 KG/M2 | HEIGHT: 65 IN | WEIGHT: 178 LBS

## 2024-08-26 DIAGNOSIS — Z12.31 SCREENING MAMMOGRAM FOR BREAST CANCER: ICD-10-CM

## 2024-08-26 PROCEDURE — 77063 BREAST TOMOSYNTHESIS BI: CPT
